# Patient Record
Sex: FEMALE | Race: WHITE | NOT HISPANIC OR LATINO | URBAN - METROPOLITAN AREA
[De-identification: names, ages, dates, MRNs, and addresses within clinical notes are randomized per-mention and may not be internally consistent; named-entity substitution may affect disease eponyms.]

---

## 2024-01-02 ENCOUNTER — APPOINTMENT (RX ONLY)
Dept: URBAN - METROPOLITAN AREA CLINIC 164 | Facility: CLINIC | Age: 49
Setting detail: DERMATOLOGY
End: 2024-01-02

## 2024-01-02 DIAGNOSIS — Z41.9 ENCOUNTER FOR PROCEDURE FOR PURPOSES OTHER THAN REMEDYING HEALTH STATE, UNSPECIFIED: ICD-10-CM

## 2024-01-02 PROCEDURE — ? VI PEEL

## 2024-01-02 PROCEDURE — ? COSMETIC QUOTE

## 2024-01-02 PROCEDURE — ? COSMETIC CONSULTATION: CHEMICAL PEELS

## 2024-01-02 PROCEDURE — ? ADDITIONAL NOTES

## 2024-01-02 PROCEDURE — ? COSMETIC CONSULTATION: HYDRAFACIAL

## 2024-01-02 NOTE — PROCEDURE: VI PEEL
Post Peel Care: After the procedure, the patient was instructed not to wash the treated area for 6-8 hours or manually remove dead skin when the peeling process starts. Patient may use OTC hydrocortisone cream for itching.  Patient instructed to use the provided Retin-A wipes on the treated area on the 1st and 2nd nights.
Price (Use Numbers Only, No Special Characters Or $): 389
Treatment Number: 1
Consent: Prior to the procedure, written consent was obtained and risks were reviewed, including but not limited to: redness, peeling, blistering, pigmentary change, scarring, infection, and pain. Patient is aware multiple treatments may be necessary to achieve the desired outcome.
Detail Level: Zone
Prep: The treated area was degreased with pre-peel cleanser, and vaseline was applied for protection of mucous membranes.
Post-Care Instructions: I reviewed with the patient in detail post-care instructions. Patient should avoid sun exposure and wear sun protection.
Chemical Peel: VI Peel Advanced

## 2024-01-02 NOTE — PROCEDURE: ADDITIONAL NOTES
Render Risk Assessment In Note?: no
Additional Notes: Patient came in for a chemical peel. Patients concerns are general aging. Patient received a VI Peel (Advanced). Patient did purchase a CP package. Patient is also purchasing a second body CP package 1 large 2 small at 10% off. Patient is coming in on 1.5.24 for her chest body peel. At that time we will scheduled the hand peel & 2nd face peel. \\n\\nPatient was pleased with treatment & left with post instructions & skin care.
Detail Level: Zone

## 2024-01-02 NOTE — PROCEDURE: COSMETIC QUOTE
Misc Procedure 2 Price/Unit (In Dollars- Use Only Numbers And Decimals): 0.00
Injectable  15 Units: 0
Number Of Months: 1
Body Procedure 1: VI Peel Body (small area)
Apply Facility Fee: No
Face Procedure 2: SkinPen
Body Procedure 1 Units: 2
Face Procedure 8 Price/Unit (In Dollars- Use Only Numbers And Decimals): 199
Body Procedure 1 Percentage Discount: 10
Face Procedure 5 Price/Unit (In Dollars- Use Only Numbers And Decimals): 389
Detail Level: Zone
Body Procedure 2 Price/Unit (In Dollars- Use Only Numbers And Decimals): 450
Face Procedure 9: VI Peel Purify
Include Sales Tax On Surgeon's Fees: Yes
Body Procedure 3 Price/Unit (In Dollars- Use Only Numbers And Decimals): 299
Misc Procedure 1: HydraFacial Keravive
Face Procedure 10: HydraFacial (Platinum)
Face Procedure 4 Price/Unit (In Dollars- Use Only Numbers And Decimals): 369
Face Procedure 7: Dermaplane add on
Face Procedure 8: HydraFacial (Signature)
Face Procedure 2 Price/Unit (In Dollars- Use Only Numbers And Decimals): 329
Face Procedure 5: Vi Peel Purify w/ Precision Plus
Body Procedure 2: VI Peel Body (large area)
Face Procedure 3: HydraFacial (Deluxe)
Face Procedure 3 Price/Unit (In Dollars- Use Only Numbers And Decimals): 265
Face Procedure 6: VI Peel Precision Plus
Body Procedure 3: HydraFacial (Back)
Face Procedure 4: Vi Peel (Advanced)
Misc Procedure 1 Price/Unit (In Dollars- Use Only Numbers And Decimals): 499
Face Procedure 4 Units: 3
Face Procedure 1: Dermaplane Facial
Face Procedure 7 Price/Unit (In Dollars- Use Only Numbers And Decimals): 50

## 2024-01-05 ENCOUNTER — APPOINTMENT (RX ONLY)
Dept: URBAN - METROPOLITAN AREA CLINIC 164 | Facility: CLINIC | Age: 49
Setting detail: DERMATOLOGY
End: 2024-01-05

## 2024-01-05 DIAGNOSIS — Z41.9 ENCOUNTER FOR PROCEDURE FOR PURPOSES OTHER THAN REMEDYING HEALTH STATE, UNSPECIFIED: ICD-10-CM

## 2024-01-05 PROCEDURE — ? ADDITIONAL NOTES

## 2024-01-05 PROCEDURE — ? VI PEEL

## 2024-01-05 NOTE — PROCEDURE: VI PEEL
Treatment Number: 1
Consent: Prior to the procedure, written consent was obtained and risks were reviewed, including but not limited to: redness, peeling, blistering, pigmentary change, scarring, infection, and pain. Patient is aware multiple treatments may be necessary to achieve the desired outcome.
Price (Use Numbers Only, No Special Characters Or $): 314.40
Prep: The treated area was degreased with pre-peel cleanser, and vaseline was applied for protection of mucous membranes.
Detail Level: Zone
Post-Care Instructions: I reviewed with the patient in detail post-care instructions. Patient should avoid sun exposure and wear sun protection.
Post Peel Care: After the procedure, the patient was instructed not to wash the treated area for 6-8 hours or manually remove dead skin when the peeling process starts. Patient may use OTC hydrocortisone cream for itching.  Patient instructed to use the provided Retin-A wipes on the treated area on the 1st and 2nd nights.
Chemical Peel: VI Peel Body Large Area

## 2024-01-05 NOTE — PROCEDURE: ADDITIONAL NOTES
Render Risk Assessment In Note?: no
Additional Notes: Patient came in for a VI Peel (Body). Patient was pleased with treatment & left with post skin care & instructions.\\n\\nPatient stated she will call back to schedule hand peels & post op for the face VI Peel we preformed on 1.2.24.
Detail Level: Zone

## 2024-01-29 ENCOUNTER — APPOINTMENT (RX ONLY)
Dept: URBAN - METROPOLITAN AREA CLINIC 164 | Facility: CLINIC | Age: 49
Setting detail: DERMATOLOGY
End: 2024-01-29

## 2024-01-29 DIAGNOSIS — Z41.9 ENCOUNTER FOR PROCEDURE FOR PURPOSES OTHER THAN REMEDYING HEALTH STATE, UNSPECIFIED: ICD-10-CM

## 2024-01-29 PROCEDURE — ? ADDITIONAL NOTES

## 2024-01-29 PROCEDURE — ? VI PEEL

## 2024-01-29 NOTE — PROCEDURE: VI PEEL
Chemical Peel: VI Peel Body Small Area
Post-Care Instructions: I reviewed with the patient in detail post-care instructions. Patient should avoid sun exposure and wear sun protection.
Post Peel Care: After the procedure, the patient was instructed not to wash the treated area for 6-8 hours or manually remove dead skin when the peeling process starts. Patient may use OTC hydrocortisone cream for itching.  Patient instructed to use the provided Retin-A wipes on the treated area on the 1st and 2nd nights.
Price (Use Numbers Only, No Special Characters Or $): 314.40
Treatment Number: 2
Prep: The treated area was degreased with pre-peel cleanser, and vaseline was applied for protection of mucous membranes.
Detail Level: Zone
Consent: Prior to the procedure, written consent was obtained and risks were reviewed, including but not limited to: redness, peeling, blistering, pigmentary change, scarring, infection, and pain. Patient is aware multiple treatments may be necessary to achieve the desired outcome.

## 2024-01-29 NOTE — PROCEDURE: ADDITIONAL NOTES
Additional Notes: Patient came in for two VI Body Peel (small area, hand & forearm). Photos were taken and progress was shown. Patient is pleased with current progress and will call back to schedule future chest peel &  facial peel.
Render Risk Assessment In Note?: no
Detail Level: Zone

## 2024-02-19 ENCOUNTER — APPOINTMENT (RX ONLY)
Dept: URBAN - METROPOLITAN AREA CLINIC 164 | Facility: CLINIC | Age: 49
Setting detail: DERMATOLOGY
End: 2024-02-19

## 2024-02-19 DIAGNOSIS — Z41.9 ENCOUNTER FOR PROCEDURE FOR PURPOSES OTHER THAN REMEDYING HEALTH STATE, UNSPECIFIED: ICD-10-CM

## 2024-02-19 DIAGNOSIS — L82.0 INFLAMED SEBORRHEIC KERATOSIS: ICD-10-CM

## 2024-02-19 PROCEDURE — ? BENIGN DESTRUCTION COSMETIC MULTI

## 2024-02-19 PROCEDURE — ? COSMETIC CONSULTATION: BOTOX

## 2024-02-19 PROCEDURE — ? BOTOX (U OR CC)

## 2024-02-19 PROCEDURE — ? ADDITIONAL NOTES

## 2024-02-19 ASSESSMENT — LOCATION SIMPLE DESCRIPTION DERM
LOCATION SIMPLE: RIGHT FOREARM
LOCATION SIMPLE: LEFT CHEEK
LOCATION SIMPLE: LEFT LIP
LOCATION SIMPLE: RIGHT HAND
LOCATION SIMPLE: LEFT EYEBROW
LOCATION SIMPLE: RIGHT TEMPLE
LOCATION SIMPLE: LEFT FOREHEAD
LOCATION SIMPLE: LEFT EYELID
LOCATION SIMPLE: GLABELLA
LOCATION SIMPLE: RIGHT EYEBROW
LOCATION SIMPLE: RIGHT EYELID
LOCATION SIMPLE: LEFT TEMPLE
LOCATION SIMPLE: CHIN
LOCATION SIMPLE: RIGHT FOREHEAD
LOCATION SIMPLE: RIGHT ANTERIOR NECK
LOCATION SIMPLE: RIGHT CHEEK
LOCATION SIMPLE: RIGHT LIP
LOCATION SIMPLE: LEFT CLAVICULAR SKIN

## 2024-02-19 ASSESSMENT — LOCATION DETAILED DESCRIPTION DERM
LOCATION DETAILED: LEFT MID TEMPLE
LOCATION DETAILED: LEFT CLAVICULAR SKIN
LOCATION DETAILED: RIGHT INFERIOR TEMPLE
LOCATION DETAILED: RIGHT SUPERIOR FOREHEAD
LOCATION DETAILED: LEFT SUPERIOR CENTRAL MALAR CHEEK
LOCATION DETAILED: LEFT LATERAL FOREHEAD
LOCATION DETAILED: LEFT MEDIAL FOREHEAD
LOCATION DETAILED: LEFT SUPERIOR MEDIAL FOREHEAD
LOCATION DETAILED: RIGHT INFERIOR ANTERIOR NECK
LOCATION DETAILED: GLABELLA
LOCATION DETAILED: RIGHT MEDIAL EYEBROW
LOCATION DETAILED: LEFT LATERAL EYEBROW
LOCATION DETAILED: RIGHT SUPERIOR CENTRAL MALAR CHEEK
LOCATION DETAILED: RIGHT RADIAL DORSAL HAND
LOCATION DETAILED: LEFT FOREHEAD
LOCATION DETAILED: LEFT UPPER CUTANEOUS LIP
LOCATION DETAILED: RIGHT MID TEMPLE
LOCATION DETAILED: LEFT INFERIOR TEMPLE
LOCATION DETAILED: RIGHT UPPER CUTANEOUS LIP
LOCATION DETAILED: RIGHT MEDIAL FOREHEAD
LOCATION DETAILED: RIGHT CENTRAL BUCCAL CHEEK
LOCATION DETAILED: RIGHT DISTAL DORSAL FOREARM
LOCATION DETAILED: LEFT MEDIAL BUCCAL CHEEK
LOCATION DETAILED: RIGHT LATERAL CANTHUS
LOCATION DETAILED: LEFT CHIN
LOCATION DETAILED: LEFT SUPERIOR FOREHEAD
LOCATION DETAILED: LEFT LATERAL CANTHUS
LOCATION DETAILED: RIGHT LATERAL FOREHEAD

## 2024-02-19 ASSESSMENT — LOCATION ZONE DERM
LOCATION ZONE: LIP
LOCATION ZONE: NECK
LOCATION ZONE: HAND
LOCATION ZONE: ARM
LOCATION ZONE: FACE
LOCATION ZONE: TRUNK
LOCATION ZONE: EYELID

## 2024-02-19 NOTE — PROCEDURE: ADDITIONAL NOTES
Detail Level: Zone
Render Risk Assessment In Note?: no
Additional Notes: Botox\\nForehead - 18\\nGlabella - 12\\nCf - 24\\nDAO - 6\\nMentalis- 4\\nLip flip - 8\\n72 units 936.00

## 2024-02-19 NOTE — PROCEDURE: BENIGN DESTRUCTION COSMETIC MULTI
Post-Care Instructions: I reviewed with the patient in detail post-care instructions. Patient is to wear sunprotection, and avoid picking at any of the treated lesions. Pt may apply Vaseline to crusted or scabbing areas.
Total Number Of Lesions Treated: 5
Price (Use Numbers Only, No Special Characters Or $): 125.00
Detail Level: Detailed
Anesthesia Volume In Cc: 0.5
Consent: The patient's consent was obtained including but not limited to risks of crusting, scabbing, blistering, scarring, darker or lighter pigmentary change, recurrence, incomplete removal and infection.

## 2024-02-19 NOTE — PROCEDURE: BOTOX (U OR CC)
Additional Area 1 Location: [facility]
Post-Care Instructions: Patient instructed to not lie down for 4 hours and limit physical activity for 24 hours.
Additional Area 6 Units: 0
Detail Level: Detailed
Dilution (U/0.1 Cc): 1
Depressor Anguli Oris Units: 6
Periorbital Skin Units/Cc: 24
Additional Area 2 Location: lip flip
Including Pricing Information In The Note: No
Forehead Units/Cc: 18
Document As Units Or Cc?: units
Consent: Written consent obtained. Risks include but not limited to lid/brow ptosis, bruising, swelling, diplopia, temporary effect, incomplete chemical denervation.
Additional Area 2 Units: 8
Glabellar Complex Units: 12
Reconstitution Date (Optional): 2026/02
Price (Use Numbers Only, No Special Characters Or $): 384.00
Additional Area 1 Units: 4
Lot #: [id number]
Quantity Per Injection Site (Units Or Cc): 2 U
Quantity Per Injection Site (Units Or Cc): 6 U
Quantity Per Injection Site (Units Or Cc): 3 U
Quantity Per Injection Site (Units Or Cc): 4 U

## 2024-03-06 ENCOUNTER — APPOINTMENT (RX ONLY)
Dept: URBAN - METROPOLITAN AREA CLINIC 164 | Facility: CLINIC | Age: 49
Setting detail: DERMATOLOGY
End: 2024-03-06

## 2024-03-06 DIAGNOSIS — Z41.9 ENCOUNTER FOR PROCEDURE FOR PURPOSES OTHER THAN REMEDYING HEALTH STATE, UNSPECIFIED: ICD-10-CM

## 2024-03-06 DIAGNOSIS — L82.0 INFLAMED SEBORRHEIC KERATOSIS: ICD-10-CM

## 2024-03-06 PROCEDURE — ? BOTOX (U OR CC)

## 2024-03-06 PROCEDURE — ? BENIGN DESTRUCTION COSMETIC

## 2024-03-06 ASSESSMENT — LOCATION DETAILED DESCRIPTION DERM
LOCATION DETAILED: LEFT FOREHEAD
LOCATION DETAILED: UPPER STERNUM
LOCATION DETAILED: RIGHT SUPERIOR MEDIAL MIDBACK
LOCATION DETAILED: RIGHT LATERAL FOREHEAD
LOCATION DETAILED: LEFT LATERAL FOREHEAD
LOCATION DETAILED: RIGHT FOREHEAD

## 2024-03-06 ASSESSMENT — LOCATION ZONE DERM
LOCATION ZONE: FACE
LOCATION ZONE: TRUNK

## 2024-03-06 ASSESSMENT — LOCATION SIMPLE DESCRIPTION DERM
LOCATION SIMPLE: RIGHT LOWER BACK
LOCATION SIMPLE: CHEST
LOCATION SIMPLE: LEFT FOREHEAD
LOCATION SIMPLE: RIGHT FOREHEAD

## 2024-03-06 NOTE — PROCEDURE: BOTOX (U OR CC)
Post-Care Instructions: Patient instructed to not lie down for 4 hours and limit physical activity for 24 hours. Patient instructed not to travel by airplane for 48 hours.
Dilution (U/0.1 Cc): 1
Lot #: [id number]
Price Per Unit Or Per Cc In $ (Use Numbers Only, No Special Characters Or $): 13
Consent: Written consent obtained. Risks include but not limited to lid/brow ptosis, bruising, swelling, diplopia, temporary effect, incomplete chemical denervation.
Measure In Units Or Cc's?: units
Detail Level: Detailed
Expiration Date (Month Year): 02/2026
Quantity Per Injection Site (Units Or Cc): 2

## 2024-03-06 NOTE — PROCEDURE: BENIGN DESTRUCTION COSMETIC
Consent: The patient's consent was obtained including but not limited to risks of crusting, scabbing, blistering, scarring, darker or lighter pigmentary change, recurrence, incomplete removal and infection.
Post-Care Instructions: I reviewed with the patient in detail post-care instructions. Patient is to wear sunprotection, and avoid picking at any of the treated lesions. Pt may apply Vaseline to crusted or scabbing areas.
Detail Level: Detailed
Price (Use Numbers Only, No Special Characters Or $): 125
Anesthesia Volume In Cc: 1
Anesthesia Type: 2% lidocaine with epinephrine

## 2024-03-18 ENCOUNTER — APPOINTMENT (RX ONLY)
Dept: URBAN - METROPOLITAN AREA CLINIC 164 | Facility: CLINIC | Age: 49
Setting detail: DERMATOLOGY
End: 2024-03-18

## 2024-03-18 DIAGNOSIS — Z41.9 ENCOUNTER FOR PROCEDURE FOR PURPOSES OTHER THAN REMEDYING HEALTH STATE, UNSPECIFIED: ICD-10-CM

## 2024-03-18 PROCEDURE — ? ADDITIONAL NOTES

## 2024-03-18 PROCEDURE — ? VI PEEL

## 2024-03-18 NOTE — PROCEDURE: ADDITIONAL NOTES
Render Risk Assessment In Note?: no
Additional Notes: Patient came in for a Chemical Peel. Patient received her 2nd VI Peel (Advanced) out of 3.\\n\\nPatient was pleased with treatment & will call back to schedule last chemical peel.
Detail Level: Zone

## 2024-03-18 NOTE — PROCEDURE: VI PEEL
Price (Use Numbers Only, No Special Characters Or $): 332.84
Treatment Number: 2
Prep: The treated area was degreased with pre-peel cleanser, and vaseline was applied for protection of mucous membranes.
Detail Level: Zone
Post Peel Care: After the procedure, the patient was instructed not to wash the treated area for 6-8 hours or manually remove dead skin when the peeling process starts. Patient may use OTC hydrocortisone cream for itching.  Patient instructed to use the provided Retin-A wipes on the treated area on the 1st and 2nd nights.
Consent: Prior to the procedure, written consent was obtained and risks were reviewed, including but not limited to: redness, peeling, blistering, pigmentary change, scarring, infection, and pain. Patient is aware multiple treatments may be necessary to achieve the desired outcome.
Post-Care Instructions: I reviewed with the patient in detail post-care instructions. Patient should avoid sun exposure and wear sun protection.
Chemical Peel: VI Peel Advanced

## 2024-03-20 ENCOUNTER — APPOINTMENT (RX ONLY)
Dept: URBAN - METROPOLITAN AREA CLINIC 164 | Facility: CLINIC | Age: 49
Setting detail: DERMATOLOGY
End: 2024-03-20

## 2024-03-20 DIAGNOSIS — Z41.9 ENCOUNTER FOR PROCEDURE FOR PURPOSES OTHER THAN REMEDYING HEALTH STATE, UNSPECIFIED: ICD-10-CM

## 2024-03-20 PROCEDURE — ? ADDITIONAL NOTES

## 2024-03-20 PROCEDURE — ? COSMETIC QUOTE

## 2024-03-20 NOTE — PROCEDURE: COSMETIC QUOTE
Misc Procedure 5 Units: 0
Anesthesia 3 Price/Unit (In Dollars- Use Only Numbers And Decimals): 0.00
Include Sales Tax: No
Body Procedure 2 Units: 3
Face Procedure 9 Price/Unit (In Dollars- Use Only Numbers And Decimals): 75
Anesthesia Fee Units (Optional): 1
Body Procedure 1 Percentage Discount: 15
Face Procedure 6 Price/Unit (In Dollars- Use Only Numbers And Decimals): 369
Face Procedure 2: Dermaplane add on
Face Procedure 5: HydraFacial (Platinum)
Body Procedure 3 Price/Unit (In Dollars- Use Only Numbers And Decimals): 299
Face Procedure 10: SkinPen
Body Procedure 5 Price/Unit (In Dollars- Use Only Numbers And Decimals): 499
Face Procedure 7: VI Peel (Purify w/ Precision Plus)
Face Procedure 1 Price/Unit (In Dollars- Use Only Numbers And Decimals): 199
Face Procedure 4 Price/Unit (In Dollars- Use Only Numbers And Decimals): 265
Face Procedure 1: Dermaplane Facial
Detail Level: Zone
Face Procedure 4: HydraFacial (Deluxe)
Body Procedure 4 Price/Unit (In Dollars- Use Only Numbers And Decimals): 329
Face Procedure 8: VI Peel (Precision Plus)
Body Procedure 2: VI Peel Body (large area)
Face Procedure 9: Milia Extractions
Face Procedure 6: VI Peel (Advanced)
Face Procedure 2 Price/Unit (In Dollars- Use Only Numbers And Decimals): 50
Body Procedure 3: HydraFacial (Back)
Body Procedure 5: Keravive (HydraFacial)
Face Procedure 7 Price/Unit (In Dollars- Use Only Numbers And Decimals): 389
Misc Procedure 1: HydraFacial Keravive
Body Procedure 1: VI Peel Body (small area)
Include Sales Tax On Implants: Yes
Face Procedure 3: HydraFacial (Signature)
Body Procedure 2 Price/Unit (In Dollars- Use Only Numbers And Decimals): 450

## 2024-03-20 NOTE — PROCEDURE: ADDITIONAL NOTES
Detail Level: Zone
Render Risk Assessment In Note?: no
Additional Notes: Patient came in for a VI Body Peel (Chest). Treatment was not preformed due to patient already used up her Chemical peel package & was not aware. On 1.29.24 instead of both hand peels we added both forearms during that treatment, which used up her other body peel. Patient stated she will reschedule after budgeting for the next VI Peel package. If patient does purchase another 3 VI Body Peels (Large) I will honor it at 15% off.

## 2024-06-05 ENCOUNTER — APPOINTMENT (RX ONLY)
Dept: URBAN - METROPOLITAN AREA CLINIC 164 | Facility: CLINIC | Age: 49
Setting detail: DERMATOLOGY
End: 2024-06-05

## 2024-06-05 DIAGNOSIS — Z41.9 ENCOUNTER FOR PROCEDURE FOR PURPOSES OTHER THAN REMEDYING HEALTH STATE, UNSPECIFIED: ICD-10-CM

## 2024-06-05 PROCEDURE — ? BOTOX (U OR CC)

## 2024-06-05 PROCEDURE — ? COSMETIC CONSULTATION: BOTOX

## 2024-06-05 PROCEDURE — ? ADDITIONAL NOTES

## 2024-06-05 ASSESSMENT — LOCATION SIMPLE DESCRIPTION DERM
LOCATION SIMPLE: RIGHT EYEBROW
LOCATION SIMPLE: LEFT FOREHEAD
LOCATION SIMPLE: RIGHT LIP
LOCATION SIMPLE: NOSE
LOCATION SIMPLE: GLABELLA
LOCATION SIMPLE: UPPER LIP
LOCATION SIMPLE: RIGHT CHEEK
LOCATION SIMPLE: LEFT SCALP
LOCATION SIMPLE: LEFT EYEBROW
LOCATION SIMPLE: SUPERIOR FOREHEAD
LOCATION SIMPLE: RIGHT FOREHEAD
LOCATION SIMPLE: LEFT EYELID
LOCATION SIMPLE: RIGHT SCALP
LOCATION SIMPLE: LEFT CHEEK
LOCATION SIMPLE: LEFT LIP
LOCATION SIMPLE: RIGHT TEMPLE

## 2024-06-05 ASSESSMENT — LOCATION DETAILED DESCRIPTION DERM
LOCATION DETAILED: RIGHT INFERIOR CENTRAL BUCCAL CHEEK
LOCATION DETAILED: LEFT UPPER CUTANEOUS LIP
LOCATION DETAILED: RIGHT LATERAL MANDIBULAR CHEEK
LOCATION DETAILED: LEFT INFERIOR LATERAL MALAR CHEEK
LOCATION DETAILED: LEFT FOREHEAD
LOCATION DETAILED: RIGHT SUPERIOR FOREHEAD
LOCATION DETAILED: RIGHT INFERIOR TEMPLE
LOCATION DETAILED: SUPERIOR MID FOREHEAD
LOCATION DETAILED: NASAL ROOT
LOCATION DETAILED: LEFT MEDIAL EYEBROW
LOCATION DETAILED: LEFT LATERAL CANTHUS
LOCATION DETAILED: RIGHT LATERAL FOREHEAD
LOCATION DETAILED: RIGHT MEDIAL EYEBROW
LOCATION DETAILED: RIGHT INFERIOR LATERAL BUCCAL CHEEK
LOCATION DETAILED: RIGHT LATERAL BUCCAL CHEEK
LOCATION DETAILED: LEFT LATERAL BUCCAL CHEEK
LOCATION DETAILED: LEFT LATERAL EYEBROW
LOCATION DETAILED: LEFT SUPERIOR LATERAL BUCCAL CHEEK
LOCATION DETAILED: LEFT MEDIAL FRONTAL SCALP
LOCATION DETAILED: RIGHT MEDIAL FRONTAL SCALP
LOCATION DETAILED: GLABELLA
LOCATION DETAILED: RIGHT SUPERIOR CENTRAL MALAR CHEEK
LOCATION DETAILED: LEFT CENTRAL BUCCAL CHEEK
LOCATION DETAILED: LEFT SUPERIOR FOREHEAD
LOCATION DETAILED: LEFT SUPERIOR CENTRAL MALAR CHEEK
LOCATION DETAILED: NASAL DORSUM
LOCATION DETAILED: PHILTRUM
LOCATION DETAILED: RIGHT UPPER CUTANEOUS LIP
LOCATION DETAILED: RIGHT MID TEMPLE
LOCATION DETAILED: LEFT INFERIOR LATERAL BUCCAL CHEEK

## 2024-06-05 ASSESSMENT — LOCATION ZONE DERM
LOCATION ZONE: EYELID
LOCATION ZONE: LIP
LOCATION ZONE: SCALP
LOCATION ZONE: NOSE
LOCATION ZONE: FACE

## 2024-06-05 NOTE — PROCEDURE: ADDITIONAL NOTES
Additional Notes: Recommended \\nGlabella 20\\nCf 24 \\nForehead 14\\nMentalis 12\\nNefertiti lift 20\\nBunny lines 6\\nLip flip 6
Render Risk Assessment In Note?: no
Detail Level: Detailed

## 2024-06-05 NOTE — PROCEDURE: BOTOX (U OR CC)
Detail Level: Detailed
Including Pricing Information In The Note: No
Additional Area 6 Units: 0
Dilution (U/0.1 Cc): 1
Additional Area 3 Units: 20
Expiration Date (Month Year): 2025/11
Periorbital Skin Units/Cc: 24
Consent: Written consent obtained. Risks include but not limited to lid/brow ptosis, bruising, swelling, diplopia, temporary effect, incomplete chemical denervation.
Nasal Root Units/Cc: 6
Additional Area 1 Units: 12
Post-Care Instructions: Patient instructed to not lie down for 4 hours and limit physical activity for 24 hours.
Price (Use Numbers Only, No Special Characters Or $): 6824.22
Lot #: [id number]
Additional Area 1 Location: [facility]
Document As Units Or Cc?: units
Additional Area 3 Location: [facility] [place]
Additional Area 2 Location: lip flip
Forehead Units/Cc: 16
Quantity Per Injection Site (Units Or Cc): 2 U
Quantity Per Injection Site (Units Or Cc): 6 U
Quantity Per Injection Site (Units Or Cc): 3 U
Quantity Per Injection Site (Units Or Cc): 4 U